# Patient Record
Sex: FEMALE | Race: WHITE | Employment: OTHER | URBAN - NONMETROPOLITAN AREA
[De-identification: names, ages, dates, MRNs, and addresses within clinical notes are randomized per-mention and may not be internally consistent; named-entity substitution may affect disease eponyms.]

---

## 2018-12-05 RX ORDER — METHYLPREDNISOLONE ACETATE 80 MG/ML
80 INJECTION, SUSPENSION INTRA-ARTICULAR; INTRALESIONAL; INTRAMUSCULAR; SOFT TISSUE ONCE
Status: CANCELLED | OUTPATIENT
Start: 2018-12-05 | End: 2018-12-05

## 2018-12-06 ENCOUNTER — HOSPITAL ENCOUNTER (OUTPATIENT)
Dept: INTERVENTIONAL RADIOLOGY/VASCULAR | Age: 63
Discharge: HOME | End: 2018-12-06
Payer: COMMERCIAL

## 2018-12-06 VITALS
RESPIRATION RATE: 18 BRPM | OXYGEN SATURATION: 98 % | SYSTOLIC BLOOD PRESSURE: 145 MMHG | TEMPERATURE: 97.7 F | HEART RATE: 71 BPM | DIASTOLIC BLOOD PRESSURE: 92 MMHG

## 2018-12-06 DIAGNOSIS — R52 PAIN: ICD-10-CM

## 2018-12-06 PROCEDURE — 2500000003 HC RX 250 WO HCPCS

## 2018-12-06 PROCEDURE — 6360000004 HC RX CONTRAST MEDICATION: Performed by: RADIOLOGY

## 2018-12-06 PROCEDURE — 2709999900 HC NON-CHARGEABLE SUPPLY

## 2018-12-06 PROCEDURE — 6360000002 HC RX W HCPCS

## 2018-12-06 PROCEDURE — 2580000003 HC RX 258

## 2018-12-06 PROCEDURE — 62321 NJX INTERLAMINAR CRV/THRC: CPT

## 2018-12-06 RX ORDER — NORTRIPTYLINE HYDROCHLORIDE 10 MG/1
10 CAPSULE ORAL NIGHTLY
COMMUNITY

## 2018-12-06 RX ORDER — DOCUSATE SODIUM 250 MG
250 CAPSULE ORAL 2 TIMES DAILY
COMMUNITY

## 2018-12-06 RX ORDER — METHYLPREDNISOLONE ACETATE 80 MG/ML
80 INJECTION, SUSPENSION INTRA-ARTICULAR; INTRALESIONAL; INTRAMUSCULAR; SOFT TISSUE ONCE
Status: COMPLETED | OUTPATIENT
Start: 2018-12-06 | End: 2018-12-06

## 2018-12-06 RX ORDER — LORATADINE 10 MG/1
10 CAPSULE, LIQUID FILLED ORAL DAILY
COMMUNITY
End: 2019-12-19

## 2018-12-06 RX ORDER — SUMATRIPTAN 50 MG/1
50 TABLET, FILM COATED ORAL
COMMUNITY

## 2018-12-06 RX ADMIN — METHYLPREDNISOLONE ACETATE 80 MG: 80 INJECTION, SUSPENSION INTRA-ARTICULAR; INTRALESIONAL; INTRAMUSCULAR; SOFT TISSUE at 13:38

## 2018-12-06 RX ADMIN — IOHEXOL 1 ML: 180 INJECTION INTRAVENOUS at 13:37

## 2018-12-06 RX ADMIN — Medication 1 ML: at 13:38

## 2018-12-06 ASSESSMENT — PAIN DESCRIPTION - DESCRIPTORS
DESCRIPTORS: ACHING
DESCRIPTORS: ACHING

## 2018-12-06 ASSESSMENT — PAIN DESCRIPTION - PAIN TYPE
TYPE: CHRONIC PAIN

## 2018-12-06 ASSESSMENT — PAIN DESCRIPTION - ORIENTATION
ORIENTATION: RIGHT
ORIENTATION: RIGHT

## 2018-12-06 ASSESSMENT — PAIN SCALES - GENERAL
PAINLEVEL_OUTOF10: 5

## 2018-12-06 ASSESSMENT — PAIN DESCRIPTION - LOCATION
LOCATION: NECK

## 2018-12-06 NOTE — PROGRESS NOTES
1245 pt arrives ambulatory for nerve block. Procedure explained and questions answered. PT RIGHTS AND RESPONSIBILITIES OFFERED TO PT. Pt denies numbness or tingling. Pt denies taking blood thinners or aspirin in past 5 days. Pedal push and pull and hand grasp strong and equal bilaterally. 1316 pt to specials via bed.   1400 pt back from specials. Vitals stable. bandaid clean, dry and intact. Pt denies new numbness or pain. Pt able to move extremities same as prior to procedure. 1415 pt denies new numbness. Vitals stable. bandaid unchanged. 1420 pt discharged ambulatory with instructions with no complaints.            _m___ Safety:       (Environmental)   [place] to environment   Ensure ID band is correct and in place/ allergy band as needed   Assess for fall risk   Initiate fall precautions as applicable (fall band, side rails, etc.)   Call light within reach   Bed in low position/ wheels locked    __m__ Pain:        Assess pain level and characteristics   Administer analgesics as ordered   Assess effectiveness of pain management and report to MD as needed    __m__ Knowledge Deficit:   Assess baseline knowledge   Provide teaching at level of understanding   Provide teaching via preferred learning method   Evaluate teaching effectiveness    __m__ Hemodynamic/Respiratory Status:       (Pre and Post Procedure Monitoring)   Assess/Monitor vital signs and LOC   Assess Baseline SpO2 prior to any sedation   Obtain weight/height   Assess vital signs/ LOC until patient meets discharge criteria   Monitor procedure site and notify MD of any issues

## 2019-12-19 ENCOUNTER — OFFICE VISIT (OUTPATIENT)
Dept: PHYSICAL MEDICINE AND REHAB | Age: 64
End: 2019-12-19
Payer: COMMERCIAL

## 2019-12-19 VITALS
HEIGHT: 66 IN | DIASTOLIC BLOOD PRESSURE: 78 MMHG | BODY MASS INDEX: 22.82 KG/M2 | WEIGHT: 142 LBS | SYSTOLIC BLOOD PRESSURE: 132 MMHG

## 2019-12-19 DIAGNOSIS — M47.819 FACET ARTHROPATHY OF SPINE: ICD-10-CM

## 2019-12-19 DIAGNOSIS — M54.81 OCCIPITAL NEURALGIA OF RIGHT SIDE: ICD-10-CM

## 2019-12-19 DIAGNOSIS — M47.812 CERVICAL SPONDYLOSIS: ICD-10-CM

## 2019-12-19 DIAGNOSIS — M54.2 CERVICALGIA: Primary | ICD-10-CM

## 2019-12-19 PROCEDURE — 99243 OFF/OP CNSLTJ NEW/EST LOW 30: CPT | Performed by: NURSE PRACTITIONER

## 2019-12-19 RX ORDER — HYDROCHLOROTHIAZIDE 25 MG/1
TABLET ORAL DAILY
COMMUNITY
Start: 2019-12-09

## 2019-12-19 RX ORDER — FAMOTIDINE 40 MG/1
TABLET, FILM COATED ORAL DAILY
COMMUNITY
Start: 2019-12-03

## 2019-12-19 RX ORDER — DIPHENOXYLATE HYDROCHLORIDE AND ATROPINE SULFATE 2.5; .025 MG/1; MG/1
TABLET ORAL DAILY
COMMUNITY

## 2019-12-19 ASSESSMENT — ENCOUNTER SYMPTOMS
SORE THROAT: 0
EYE ITCHING: 0
RHINORRHEA: 0
NAUSEA: 0
EYE REDNESS: 0
SHORTNESS OF BREATH: 0
CONSTIPATION: 1
DIARRHEA: 0
SINUS PAIN: 0
ABDOMINAL PAIN: 0
EYE PAIN: 0
BACK PAIN: 1
APNEA: 0
COUGH: 0
COLOR CHANGE: 0
SINUS PRESSURE: 0
VOMITING: 0
CHEST TIGHTNESS: 0

## 2020-01-06 ENCOUNTER — PREP FOR PROCEDURE (OUTPATIENT)
Dept: PHYSICAL MEDICINE AND REHAB | Age: 65
End: 2020-01-06

## 2020-01-06 NOTE — H&P
Kim Rudolph is an 62 y.o.  female. ChiefComplaint: Neck pain and Headaches                The patient is allergic to levaquin [levofloxacin in d5w] and sulfa antibiotics.     PastMedical History  Angeli Porras  has a past medical history of Arthritis.     Past Surgical History  The patient  has a past surgical history that includes Hand surgery (Left, 2018); Hand surgery (Right, ); Thyroid surgery (); Septoplasty (2018);  section (2574,9734,2132,0988,0172); laminectomy (); Hysterectomy (); Abdominal adhesion surgery (); and lumbar laminectomy ().    Family History  This patient's family history is not on file.     Social History  Angeli Porras  reports that she has quit smoking. She has never used smokeless tobacco. She reports previous alcohol use. She reports that she does not use drugs.     Medications    Current Medication      Current Outpatient Medications:     famotidine (PEPCID) 40 MG tablet, , Disp: , Rfl:     Multiple Vitamin (MULTI-VITAMINS) TABS, Take by mouth, Disp: , Rfl:     NONFORMULARY, Hormone, Disp: , Rfl:     hydrochlorothiazide (HYDRODIURIL) 25 MG tablet, , Disp: , Rfl:     Methylcellulose, Laxative, (CITRUCEL PO), Take by mouth, Disp: , Rfl:     docusate sodium (COLACE) 250 MG capsule, Take 250 mg by mouth 2 times daily, Disp: , Rfl:     SUMAtriptan (IMITREX) 50 MG tablet, Take 50 mg by mouth once as needed for Migraine, Disp: , Rfl:     nortriptyline (PAMELOR) 10 MG capsule, Take 10 mg by mouth nightly, Disp: , Rfl:     Cyanocobalamin (B-12 PO), Take by mouth 2 times daily, Disp: , Rfl:     MAGNESIUM PO, Take 400 mg by mouth daily , Disp: , Rfl:     CycloSPORINE (RESTASIS OP), Apply to eye 2 times daily, Disp: , Rfl:     Cholecalciferol (VITAMIN D PO), Take by mouth daily, Disp: , Rfl:         Subjective:      Review of Systems   Constitutional: Positive for activity change. Negative for chills, diaphoresis, fatigue and fever.    HENT: Negative for congestion, ear discharge, ear pain, mouth sores, nosebleeds, postnasal drip, rhinorrhea, sinus pressure, sinus pain and sore throat. Eyes: Negative for pain, redness and itching. Respiratory: Negative for apnea, cough, chest tightness and shortness of breath. Cardiovascular: Negative for chest pain, palpitations and leg swelling. Gastrointestinal: Positive for constipation. Negative for abdominal pain, diarrhea, nausea and vomiting. Endocrine: Negative for cold intolerance and heat intolerance. Genitourinary: Negative for difficulty urinating, frequency and urgency. Musculoskeletal: Positive for arthralgias, back pain, myalgias, neck pain and neck stiffness. Negative for gait problem. Skin: Negative for color change, rash and wound. Allergic/Immunologic: Positive for environmental allergies. Negative for food allergies. Neurological: Positive for headaches. Negative for dizziness, seizures, light-headedness and numbness. Hematological: Does not bruise/bleed easily. Psychiatric/Behavioral: Negative for sleep disturbance. The patient is not nervous/anxious.          Objective:      Vitals                              Weight: 142 lb (64.4 kg)   Height: 5' 5.5\" (1.664 m)            Physical Exam  Vitals signs reviewed. Constitutional:       General: She is not in acute distress. Appearance: She is well-developed. She is not diaphoretic. HENT:      Head: Normocephalic and atraumatic. Not macrocephalic and not microcephalic. Right Ear: External ear normal.      Left Ear: External ear normal.   Eyes:      General:         Right eye: No discharge. Left eye: No discharge. Conjunctiva/sclera: Conjunctivae normal.   Neck:      Musculoskeletal: Pain with movement and muscular tenderness present. Trachea: No tracheal deviation. Cardiovascular:      Rate and Rhythm: Normal rate and regular rhythm. Pulses: Normal pulses. Heart sounds: Murmur present.  Systolic

## 2020-01-10 NOTE — PROGRESS NOTES
NPO after midnight  [employer] and drivers license  Wear comfortable clean clothing  Do not bring jewelry  Shower night before and morning of surgery with a liquid antibacterial soap  Bring list of medications with dosage and how often taken  Follow all instructions given by your physician   needed at discharge  Please call Dr. Frantz Franklin office if you develop and illness, have a fever, or start antibiotics or steroids  Call [phone] for any questions

## 2020-01-13 ENCOUNTER — TELEPHONE (OUTPATIENT)
Dept: PHYSICAL MEDICINE AND REHAB | Age: 65
End: 2020-01-13

## 2020-01-13 NOTE — TELEPHONE ENCOUNTER
Procedure rescheduled as pt's. Mother passed away. Rescheduled to 1/23/20 @ 7:30, arrive at 6:30am. Requests to keep follow up appt. Due to going to [place] in February.

## 2020-01-14 ENCOUNTER — PREP FOR PROCEDURE (OUTPATIENT)
Dept: PHYSICAL MEDICINE AND REHAB | Age: 65
End: 2020-01-14

## 2020-01-14 NOTE — H&P (VIEW-ONLY)
congestion, ear discharge, ear pain, mouth sores, nosebleeds, postnasal drip, rhinorrhea, sinus pressure, sinus pain and sore throat. Eyes: Negative for pain, redness and itching. Respiratory: Negative for apnea, cough, chest tightness and shortness of breath. Cardiovascular: Negative for chest pain, palpitations and leg swelling. Gastrointestinal: Positive for constipation. Negative for abdominal pain, diarrhea, nausea and vomiting. Endocrine: Negative for cold intolerance and heat intolerance. Genitourinary: Negative for difficulty urinating, frequency and urgency. Musculoskeletal: Positive for arthralgias, back pain, myalgias, neck pain and neck stiffness. Negative for gait problem. Skin: Negative for color change, rash and wound. Allergic/Immunologic: Positive for environmental allergies. Negative for food allergies. Neurological: Positive for headaches. Negative for dizziness, seizures, light-headedness and numbness. Hematological: Does not bruise/bleed easily. Psychiatric/Behavioral: Negative for sleep disturbance. The patient is not nervous/anxious.          Objective:      Vitals                              Weight: 142 lb (64.4 kg)   Height: 5' 5.5\" (1.664 m)            Physical Exam  Vitals signs reviewed. Constitutional:       General: She is not in acute distress. Appearance: She is well-developed. She is not diaphoretic. HENT:      Head: Normocephalic and atraumatic. Not macrocephalic and not microcephalic. Right Ear: External ear normal.      Left Ear: External ear normal.   Eyes:      General:         Right eye: No discharge. Left eye: No discharge. Conjunctiva/sclera: Conjunctivae normal.   Neck:      Musculoskeletal: Pain with movement and muscular tenderness present. Trachea: No tracheal deviation. Cardiovascular:      Rate and Rhythm: Normal rate and regular rhythm. Pulses: Normal pulses. Heart sounds: Murmur present.  Systolic murmur present with a grade of 2/6. No friction rub. No gallop. Pulmonary:      Effort: Pulmonary effort is normal. No respiratory distress. Breath sounds: Normal breath sounds. No stridor. No wheezing or rhonchi. Abdominal:      General: Bowel sounds are normal. There is no distension. Palpations: Abdomen is soft. Tenderness: There is no tenderness. Hernia: No hernia is present. Musculoskeletal:         General: Tenderness present. Skin:     General: Skin is warm and dry. Coloration: Skin is not pale. Findings: No rash. Neurological:      Mental Status: She is alert and oriented to person, place, and time. Cranial Nerves: No cranial nerve deficit. Psychiatric:         Attention and Perception: She is attentive. Speech: Speech normal.         Behavior: Behavior normal.         Thought Content: Thought content normal.         Judgment: Judgment normal.            Assessment:      1. Cervicalgia    2. Cervical spondylosis    3. Facet arthropathy of spine    4.  Occipital neuralgia of right side          Plan:  C-facet MBB C1-2, C2-3, C3-4 RIGHT #1       JARAD Thompson - CNP  1/14/2020

## 2020-01-14 NOTE — H&P
congestion, ear discharge, ear pain, mouth sores, nosebleeds, postnasal drip, rhinorrhea, sinus pressure, sinus pain and sore throat. Eyes: Negative for pain, redness and itching. Respiratory: Negative for apnea, cough, chest tightness and shortness of breath. Cardiovascular: Negative for chest pain, palpitations and leg swelling. Gastrointestinal: Positive for constipation. Negative for abdominal pain, diarrhea, nausea and vomiting. Endocrine: Negative for cold intolerance and heat intolerance. Genitourinary: Negative for difficulty urinating, frequency and urgency. Musculoskeletal: Positive for arthralgias, back pain, myalgias, neck pain and neck stiffness. Negative for gait problem. Skin: Negative for color change, rash and wound. Allergic/Immunologic: Positive for environmental allergies. Negative for food allergies. Neurological: Positive for headaches. Negative for dizziness, seizures, light-headedness and numbness. Hematological: Does not bruise/bleed easily. Psychiatric/Behavioral: Negative for sleep disturbance. The patient is not nervous/anxious.          Objective:      Vitals                              Weight: 142 lb (64.4 kg)   Height: 5' 5.5\" (1.664 m)            Physical Exam  Vitals signs reviewed. Constitutional:       General: She is not in acute distress. Appearance: She is well-developed. She is not diaphoretic. HENT:      Head: Normocephalic and atraumatic. Not macrocephalic and not microcephalic. Right Ear: External ear normal.      Left Ear: External ear normal.   Eyes:      General:         Right eye: No discharge. Left eye: No discharge. Conjunctiva/sclera: Conjunctivae normal.   Neck:      Musculoskeletal: Pain with movement and muscular tenderness present. Trachea: No tracheal deviation. Cardiovascular:      Rate and Rhythm: Normal rate and regular rhythm. Pulses: Normal pulses. Heart sounds: Murmur present.  Systolic murmur present with a grade of 2/6. No friction rub. No gallop. Pulmonary:      Effort: Pulmonary effort is normal. No respiratory distress. Breath sounds: Normal breath sounds. No stridor. No wheezing or rhonchi. Abdominal:      General: Bowel sounds are normal. There is no distension. Palpations: Abdomen is soft. Tenderness: There is no tenderness. Hernia: No hernia is present. Musculoskeletal:         General: Tenderness present. Skin:     General: Skin is warm and dry. Coloration: Skin is not pale. Findings: No rash. Neurological:      Mental Status: She is alert and oriented to person, place, and time. Cranial Nerves: No cranial nerve deficit. Psychiatric:         Attention and Perception: She is attentive. Speech: Speech normal.         Behavior: Behavior normal.         Thought Content: Thought content normal.         Judgment: Judgment normal.            Assessment:      1. Cervicalgia    2. Cervical spondylosis    3. Facet arthropathy of spine    4.  Occipital neuralgia of right side          Plan:  C-facet MBB C1-2, C2-3, C3-4 RIGHT #1       JARAD Rg - CNP  1/14/2020

## 2020-01-23 ENCOUNTER — ANESTHESIA (OUTPATIENT)
Dept: OPERATING ROOM | Age: 65
End: 2020-01-23
Payer: COMMERCIAL

## 2020-01-23 ENCOUNTER — HOSPITAL ENCOUNTER (OUTPATIENT)
Age: 65
Setting detail: OUTPATIENT SURGERY
Discharge: HOME | End: 2020-01-23
Attending: PAIN MEDICINE | Admitting: PAIN MEDICINE
Payer: COMMERCIAL

## 2020-01-23 ENCOUNTER — APPOINTMENT (OUTPATIENT)
Dept: GENERAL RADIOLOGY | Age: 65
End: 2020-01-23
Attending: PAIN MEDICINE
Payer: COMMERCIAL

## 2020-01-23 ENCOUNTER — ANESTHESIA EVENT (OUTPATIENT)
Dept: OPERATING ROOM | Age: 65
End: 2020-01-23
Payer: COMMERCIAL

## 2020-01-23 VITALS
WEIGHT: 146 LBS | BODY MASS INDEX: 24.32 KG/M2 | TEMPERATURE: 97.2 F | HEART RATE: 58 BPM | SYSTOLIC BLOOD PRESSURE: 163 MMHG | RESPIRATION RATE: 16 BRPM | HEIGHT: 65 IN | OXYGEN SATURATION: 98 % | DIASTOLIC BLOOD PRESSURE: 80 MMHG

## 2020-01-23 VITALS
RESPIRATION RATE: 16 BRPM | OXYGEN SATURATION: 100 % | DIASTOLIC BLOOD PRESSURE: 80 MMHG | SYSTOLIC BLOOD PRESSURE: 156 MMHG

## 2020-01-23 PROCEDURE — 3600000055 HC PAIN LEVEL 3 ADDL 15 MIN: Performed by: PAIN MEDICINE

## 2020-01-23 PROCEDURE — 2500000003 HC RX 250 WO HCPCS: Performed by: PAIN MEDICINE

## 2020-01-23 PROCEDURE — 6360000002 HC RX W HCPCS: Performed by: PAIN MEDICINE

## 2020-01-23 PROCEDURE — 2580000003 HC RX 258: Performed by: NURSE ANESTHETIST, CERTIFIED REGISTERED

## 2020-01-23 PROCEDURE — 64491 INJ PARAVERT F JNT C/T 2 LEV: CPT | Performed by: PAIN MEDICINE

## 2020-01-23 PROCEDURE — 7100000010 HC PHASE II RECOVERY - FIRST 15 MIN: Performed by: PAIN MEDICINE

## 2020-01-23 PROCEDURE — 3209999900 FLUORO FOR SURGICAL PROCEDURES

## 2020-01-23 PROCEDURE — 2709999900 HC NON-CHARGEABLE SUPPLY: Performed by: PAIN MEDICINE

## 2020-01-23 PROCEDURE — 6360000002 HC RX W HCPCS: Performed by: NURSE ANESTHETIST, CERTIFIED REGISTERED

## 2020-01-23 PROCEDURE — 64490 INJ PARAVERT F JNT C/T 1 LEV: CPT | Performed by: PAIN MEDICINE

## 2020-01-23 PROCEDURE — 3700000001 HC ADD 15 MINUTES (ANESTHESIA): Performed by: PAIN MEDICINE

## 2020-01-23 PROCEDURE — 3600000054 HC PAIN LEVEL 3 BASE: Performed by: PAIN MEDICINE

## 2020-01-23 PROCEDURE — 7100000011 HC PHASE II RECOVERY - ADDTL 15 MIN: Performed by: PAIN MEDICINE

## 2020-01-23 PROCEDURE — 64492 INJ PARAVERT F JNT C/T 3 LEV: CPT | Performed by: PAIN MEDICINE

## 2020-01-23 PROCEDURE — 3700000000 HC ANESTHESIA ATTENDED CARE: Performed by: PAIN MEDICINE

## 2020-01-23 RX ORDER — BUPIVACAINE HYDROCHLORIDE 2.5 MG/ML
INJECTION, SOLUTION EPIDURAL; INFILTRATION; INTRACAUDAL PRN
Status: DISCONTINUED | OUTPATIENT
Start: 2020-01-23 | End: 2020-01-23 | Stop reason: ALTCHOICE

## 2020-01-23 RX ORDER — FENTANYL CITRATE 50 UG/ML
INJECTION, SOLUTION INTRAMUSCULAR; INTRAVENOUS PRN
Status: DISCONTINUED | OUTPATIENT
Start: 2020-01-23 | End: 2020-01-23 | Stop reason: SDUPTHER

## 2020-01-23 RX ORDER — LIDOCAINE HYDROCHLORIDE 10 MG/ML
INJECTION, SOLUTION INFILTRATION; PERINEURAL PRN
Status: DISCONTINUED | OUTPATIENT
Start: 2020-01-23 | End: 2020-01-23 | Stop reason: ALTCHOICE

## 2020-01-23 RX ORDER — SODIUM CHLORIDE 9 MG/ML
INJECTION, SOLUTION INTRAVENOUS CONTINUOUS PRN
Status: DISCONTINUED | OUTPATIENT
Start: 2020-01-23 | End: 2020-01-23 | Stop reason: SDUPTHER

## 2020-01-23 RX ORDER — DEXAMETHASONE SODIUM PHOSPHATE 4 MG/ML
INJECTION, SOLUTION INTRA-ARTICULAR; INTRALESIONAL; INTRAMUSCULAR; INTRAVENOUS; SOFT TISSUE PRN
Status: DISCONTINUED | OUTPATIENT
Start: 2020-01-23 | End: 2020-01-23 | Stop reason: ALTCHOICE

## 2020-01-23 RX ADMIN — FENTANYL CITRATE 50 MCG: 50 INJECTION, SOLUTION INTRAMUSCULAR; INTRAVENOUS at 08:11

## 2020-01-23 RX ADMIN — FENTANYL CITRATE 50 MCG: 50 INJECTION, SOLUTION INTRAMUSCULAR; INTRAVENOUS at 08:09

## 2020-01-23 RX ADMIN — SODIUM CHLORIDE: 9 INJECTION, SOLUTION INTRAVENOUS at 08:05

## 2020-01-23 ASSESSMENT — PULMONARY FUNCTION TESTS
PIF_VALUE: 0

## 2020-01-23 ASSESSMENT — PAIN DESCRIPTION - LOCATION: LOCATION: NECK

## 2020-01-23 ASSESSMENT — PAIN SCALES - GENERAL: PAINLEVEL_OUTOF10: 9

## 2020-01-23 ASSESSMENT — PAIN DESCRIPTION - ORIENTATION: ORIENTATION: RIGHT

## 2020-01-23 ASSESSMENT — PAIN DESCRIPTION - PAIN TYPE: TYPE: CHRONIC PAIN

## 2020-01-23 NOTE — INTERVAL H&P NOTE
H&P Update    Patient's History and Physical from January 14, 2020 was reviewed. Patient examined. There has been no change.     Electronically signed by Aminah Carlson MD on 1/23/20 at 8:04 AM
[facility]

## 2020-01-23 NOTE — PROGRESS NOTES
1484- Pt arrived to PACU phase 2, Ed Rn at bedside, pt hooked up to monitor, VSS, pt breathing deeply on room air, BP 160s, pt sates sometimes she is high like that.   0825- Ice pack applied  0826- Water given,  at bedside. 0840- IV removed. Pt to get dressed.    4829- Pt given discharge instructions verbalized understanding  91 11 64- Pt discharged walked to car with this RN

## 2020-01-30 ENCOUNTER — OFFICE VISIT (OUTPATIENT)
Dept: PHYSICAL MEDICINE AND REHAB | Age: 65
End: 2020-01-30
Payer: COMMERCIAL

## 2020-01-30 VITALS
SYSTOLIC BLOOD PRESSURE: 106 MMHG | DIASTOLIC BLOOD PRESSURE: 60 MMHG | WEIGHT: 145.94 LBS | HEIGHT: 65 IN | BODY MASS INDEX: 24.32 KG/M2

## 2020-01-30 PROCEDURE — 99213 OFFICE O/P EST LOW 20 MIN: CPT | Performed by: NURSE PRACTITIONER

## 2020-01-30 ASSESSMENT — ENCOUNTER SYMPTOMS
VOMITING: 0
ABDOMINAL PAIN: 0
DIARRHEA: 0
NAUSEA: 0
BACK PAIN: 1
COLOR CHANGE: 0
CONSTIPATION: 1

## 2020-01-30 NOTE — PROGRESS NOTES
[facility]  [address]  Dept: [phone]  Dept Fax: [phone]: [phone]    Visit Date: 1/30/2020    Functionality Assessment/Goals Worksheet     On a scale of 0 (Does not Interfere) to 10 (Completely Interferes)     1. Which number describes how during the past week pain has interfered with       the following:  A. General Activity:  1  B. Mood: 0  C. Walking Ability:  0  D. Normal Work (Includes both work outside the home and housework):  0  E. Relations with Other People:   0  F. Sleep:   0  G. Enjoyment of Life:   1    2. Patient Prefers to Take their Pain Medications:     []  On a regular basis   []  Only when necessary    [x]  Does not take pain medications    3. What are the Patient's Goals/Expectations for Visiting Pain Management? []  Learn about my pain    []  Receive Medication   []  Physical Therapy     []  Treat Depression   []  Receive Injections    []  Treat Sleep   []  Deal with Anxiety and Stress   []  Treat Opoid Dependence/Addiction   [x]  Other: nerve block       HPI:   Charity Hoffman is a 62 y.o. female is here today for    Chief Complaint: Neck pain    HPI     Diagnostic / Confirmatory Median Branch Blocks at the levels of C1-2, C2-3 and C3-4 right under fluoroscopic guidance with Dr To Reyes on 1/23/2020. Patient states about 95% for the first two days and then started to fade. Patient states she still feels 70% relief today. Patient  Going OOT and will be back beginning of March. Medications reviewed. Pain scale with out pain medications or at its worst is 6/10. Pain scale with pain medications or at its best is 0/10. Drug screen reviewed from 12/19/19 and was appropriate      The patientis allergic to levaquin [levofloxacin in d5w] and sulfa antibiotics. Past Medical History  Cheo Gonzalez  has a past medical history of Arthritis.     Past Surgical History  The patient  has a past surgical history that includes Hand surgery (Left, 2018); Hand surgery (Right, ); Thyroid surgery (); Septoplasty (2018);  section (0787,9365,7767,9096,3701); laminectomy (); Hysterectomy (); Abdominal adhesion surgery (); lumbar laminectomy (); cyst removal; and Facet joint injection (Right, 2020). Family History  This patient's family history is not on file. Social History  James Knight  reports that she has quit smoking. She has never used smokeless tobacco. She reports previous alcohol use. She reports that she does not use drugs. Medications    Current Outpatient Medications:     famotidine (PEPCID) 40 MG tablet, daily , Disp: , Rfl:     Multiple Vitamin (MULTI-VITAMINS) TABS, Take by mouth daily , Disp: , Rfl:     NONFORMULARY, Hormone implant every 3 months, Disp: , Rfl:     hydrochlorothiazide (HYDRODIURIL) 25 MG tablet, daily , Disp: , Rfl:     Methylcellulose, Laxative, (CITRUCEL PO), Take by mouth, Disp: , Rfl:     docusate sodium (COLACE) 250 MG capsule, Take 250 mg by mouth 2 times daily, Disp: , Rfl:     SUMAtriptan (IMITREX) 50 MG tablet, Take 50 mg by mouth once as needed for Migraine, Disp: , Rfl:     nortriptyline (PAMELOR) 10 MG capsule, Take 10 mg by mouth nightly, Disp: , Rfl:     Cyanocobalamin (B-12 PO), Take by mouth daily , Disp: , Rfl:     MAGNESIUM PO, Take 400 mg by mouth daily , Disp: , Rfl:     CycloSPORINE (RESTASIS OP), Apply to eye 2 times daily, Disp: , Rfl:     Cholecalciferol (VITAMIN D PO), Take by mouth daily, Disp: , Rfl:     Subjective:      Review of Systems   Constitutional: Positive for activity change. Negative for chills, diaphoresis, fatigue and fever. Gastrointestinal: Positive for constipation. Negative for abdominal pain, diarrhea, nausea and vomiting. Musculoskeletal: Positive for arthralgias, back pain, myalgias, neck pain and neck stiffness.  Negative for gait Current MED: 0  · Patient was not offered naloxone for home. · Discussed long term side effects of medications, tolerance, dependency and addiction. · Previous UDS reviewed  · Patient told can not receive any pain medications from any other source. · No evidence of abuse, diversion or aberrant behavior.  Medications and/or procedures to improve function and quality of life- patient understanding with this and that may not be pain free   Discussed with patient about safe storage of medications at home   Discussed possible weaning of medication dosing dependent on treatment/procedure results.  Testing: none   Procedures: Cervical facet MBB C1-2, C2-3 and C3-4 right #2   Discussed with patient about risks with procedure including infection, reaction to medication, increased pain, or bleeding.  Medications: none       Meds. Prescribed:   No orders of the defined types were placed in this encounter. Return for Cervical facet MBB C1-2, C2-3 and C3-4 right #2, follow up after procedure.          Electronically signed by JARAD Dougherty CNP on1/30/2020 at 8:57 AM

## 2020-02-25 ENCOUNTER — PREP FOR PROCEDURE (OUTPATIENT)
Dept: PHYSICAL MEDICINE AND REHAB | Age: 65
End: 2020-02-25

## 2020-02-25 NOTE — H&P (VIEW-ONLY)
chills, diaphoresis, fatigue and fever. Gastrointestinal: Positive for constipation. Negative for abdominal pain, diarrhea, nausea and vomiting. Musculoskeletal: Positive for arthralgias, back pain, myalgias, neck pain and neck stiffness. Negative for gait problem. Skin: Negative for color change, rash and wound. Allergic/Immunologic: Positive for environmental allergies. Negative for food allergies. Neurological: Positive for headaches. Negative for dizziness, seizures, light-headedness and numbness. Hematological: Does not bruise/bleed easily. Psychiatric/Behavioral: Negative for sleep disturbance. The patient is not nervous/anxious.          Objective:      Vitals   Weight: 145 lb 15.1 oz (66.2 kg)   Height: 5' 5\" (1.651 m)            Physical Exam  Vitals signs reviewed. Constitutional:       General: She is not in acute distress. Appearance: She is well-developed. She is not diaphoretic. HENT:      Head: Normocephalic and atraumatic. Not macrocephalic and not microcephalic. Right Ear: External ear normal.      Left Ear: External ear normal.   Eyes:      General:         Right eye: No discharge. Left eye: No discharge. Conjunctiva/sclera: Conjunctivae normal.   Neck:      Musculoskeletal: Pain with movement and muscular tenderness present. Trachea: No tracheal deviation. Pulmonary:      Effort: Pulmonary effort is normal. No respiratory distress. Musculoskeletal:         General: Tenderness present. Skin:     General: Skin is warm and dry. Coloration: Skin is not pale. Findings: No rash. Neurological:      Mental Status: She is alert and oriented to person, place, and time. Cranial Nerves: No cranial nerve deficit. Psychiatric:         Attention and Perception: She is attentive. Speech: Speech normal.         Behavior: Behavior normal.         Thought Content:  Thought content normal.         Judgment: Judgment normal.          Assessment:      1. Cervical spondylosis    2. Cervicalgia    3. Facet arthropathy of spine    4.  Occipital neuralgia of right side         Review of Systems    Physical Exam        Plan:  Cervical facet MBB C1-2, C2-3 and C3-4 right #2    JARAD Connors - CNP  2/25/2020

## 2020-03-06 ENCOUNTER — HOSPITAL ENCOUNTER (OUTPATIENT)
Age: 65
Setting detail: OUTPATIENT SURGERY
Discharge: HOME | End: 2020-03-06
Attending: PAIN MEDICINE | Admitting: PAIN MEDICINE
Payer: COMMERCIAL

## 2020-03-06 ENCOUNTER — ANESTHESIA (OUTPATIENT)
Dept: OPERATING ROOM | Age: 65
End: 2020-03-06
Payer: COMMERCIAL

## 2020-03-06 ENCOUNTER — APPOINTMENT (OUTPATIENT)
Dept: GENERAL RADIOLOGY | Age: 65
End: 2020-03-06
Attending: PAIN MEDICINE
Payer: COMMERCIAL

## 2020-03-06 ENCOUNTER — ANESTHESIA EVENT (OUTPATIENT)
Dept: OPERATING ROOM | Age: 65
End: 2020-03-06
Payer: COMMERCIAL

## 2020-03-06 VITALS
SYSTOLIC BLOOD PRESSURE: 144 MMHG | HEIGHT: 65 IN | TEMPERATURE: 98.3 F | OXYGEN SATURATION: 96 % | BODY MASS INDEX: 24.36 KG/M2 | WEIGHT: 146.2 LBS | RESPIRATION RATE: 16 BRPM | DIASTOLIC BLOOD PRESSURE: 77 MMHG | HEART RATE: 59 BPM

## 2020-03-06 VITALS
DIASTOLIC BLOOD PRESSURE: 77 MMHG | RESPIRATION RATE: 16 BRPM | SYSTOLIC BLOOD PRESSURE: 136 MMHG | OXYGEN SATURATION: 96 %

## 2020-03-06 PROCEDURE — 6360000002 HC RX W HCPCS: Performed by: NURSE ANESTHETIST, CERTIFIED REGISTERED

## 2020-03-06 PROCEDURE — 6360000002 HC RX W HCPCS: Performed by: PAIN MEDICINE

## 2020-03-06 PROCEDURE — 64490 INJ PARAVERT F JNT C/T 1 LEV: CPT | Performed by: PAIN MEDICINE

## 2020-03-06 PROCEDURE — 7100000011 HC PHASE II RECOVERY - ADDTL 15 MIN: Performed by: PAIN MEDICINE

## 2020-03-06 PROCEDURE — 3209999900 FLUORO FOR SURGICAL PROCEDURES

## 2020-03-06 PROCEDURE — 3700000000 HC ANESTHESIA ATTENDED CARE: Performed by: PAIN MEDICINE

## 2020-03-06 PROCEDURE — 64492 INJ PARAVERT F JNT C/T 3 LEV: CPT | Performed by: PAIN MEDICINE

## 2020-03-06 PROCEDURE — 7100000010 HC PHASE II RECOVERY - FIRST 15 MIN: Performed by: PAIN MEDICINE

## 2020-03-06 PROCEDURE — 2580000003 HC RX 258: Performed by: NURSE ANESTHETIST, CERTIFIED REGISTERED

## 2020-03-06 PROCEDURE — 64491 INJ PARAVERT F JNT C/T 2 LEV: CPT | Performed by: PAIN MEDICINE

## 2020-03-06 PROCEDURE — 2500000003 HC RX 250 WO HCPCS: Performed by: PAIN MEDICINE

## 2020-03-06 PROCEDURE — 2709999900 HC NON-CHARGEABLE SUPPLY: Performed by: PAIN MEDICINE

## 2020-03-06 PROCEDURE — 3600000054 HC PAIN LEVEL 3 BASE: Performed by: PAIN MEDICINE

## 2020-03-06 RX ORDER — FENTANYL CITRATE 50 UG/ML
INJECTION, SOLUTION INTRAMUSCULAR; INTRAVENOUS PRN
Status: DISCONTINUED | OUTPATIENT
Start: 2020-03-06 | End: 2020-03-06 | Stop reason: SDUPTHER

## 2020-03-06 RX ORDER — BUPIVACAINE HYDROCHLORIDE 2.5 MG/ML
INJECTION, SOLUTION EPIDURAL; INFILTRATION; INTRACAUDAL PRN
Status: DISCONTINUED | OUTPATIENT
Start: 2020-03-06 | End: 2020-03-06 | Stop reason: ALTCHOICE

## 2020-03-06 RX ORDER — SODIUM CHLORIDE 9 MG/ML
INJECTION, SOLUTION INTRAVENOUS CONTINUOUS PRN
Status: DISCONTINUED | OUTPATIENT
Start: 2020-03-06 | End: 2020-03-06 | Stop reason: SDUPTHER

## 2020-03-06 RX ORDER — DEXAMETHASONE SODIUM PHOSPHATE 4 MG/ML
INJECTION, SOLUTION INTRA-ARTICULAR; INTRALESIONAL; INTRAMUSCULAR; INTRAVENOUS; SOFT TISSUE PRN
Status: DISCONTINUED | OUTPATIENT
Start: 2020-03-06 | End: 2020-03-06 | Stop reason: ALTCHOICE

## 2020-03-06 RX ORDER — LIDOCAINE HYDROCHLORIDE 10 MG/ML
INJECTION, SOLUTION INFILTRATION; PERINEURAL PRN
Status: DISCONTINUED | OUTPATIENT
Start: 2020-03-06 | End: 2020-03-06 | Stop reason: ALTCHOICE

## 2020-03-06 RX ADMIN — FENTANYL CITRATE 100 MCG: 50 INJECTION, SOLUTION INTRAMUSCULAR; INTRAVENOUS at 07:53

## 2020-03-06 RX ADMIN — SODIUM CHLORIDE: 9 INJECTION, SOLUTION INTRAVENOUS at 07:52

## 2020-03-06 ASSESSMENT — PULMONARY FUNCTION TESTS
PIF_VALUE: 0

## 2020-03-06 ASSESSMENT — PAIN DESCRIPTION - DESCRIPTORS: DESCRIPTORS: CONSTANT;ACHING;SHARP;SHOOTING

## 2020-03-06 ASSESSMENT — PAIN - FUNCTIONAL ASSESSMENT: PAIN_FUNCTIONAL_ASSESSMENT: 0-10

## 2020-03-06 ASSESSMENT — PAIN SCALES - GENERAL: PAINLEVEL_OUTOF10: 0

## 2020-03-06 NOTE — ANESTHESIA PRE PROCEDURE
Department of Anesthesiology  Preprocedure Note       Name:  Anitra Miller   Age:  60 y.o.  :  1955                                          MRN:  [id number]         Date:  3/6/2020      Surgeon: Vane Jalloh):  Tree Brandon MD    Procedure: Cervical facet MBB C1-2, C2-3 and C3-4 right #2 (Right Neck)    Medications prior to admission:   Prior to Admission medications    Medication Sig Start Date End Date Taking? Authorizing Provider   famotidine (PEPCID) 40 MG tablet daily  12/3/19  Yes Historical Provider, MD   hydrochlorothiazide (HYDRODIURIL) 25 MG tablet daily  19  Yes Historical Provider, MD   Methylcellulose, Laxative, (CITRUCEL PO) Take by mouth   Yes Historical Provider, MD   docusate sodium (COLACE) 250 MG capsule Take 250 mg by mouth 2 times daily   Yes Historical Provider, MD   SUMAtriptan (IMITREX) 50 MG tablet Take 50 mg by mouth once as needed for Migraine   Yes Historical Provider, MD   nortriptyline (PAMELOR) 10 MG capsule Take 10 mg by mouth nightly   Yes Historical Provider, MD   CycloSPORINE (RESTASIS OP) Apply to eye 2 times daily   Yes Historical Provider, MD   Multiple Vitamin (MULTI-VITAMINS) TABS Take by mouth daily     Historical Provider, MD   NONFORMULARY Hormone implant every 3 months    Historical Provider, MD   Cyanocobalamin (B-12 PO) Take by mouth daily     Historical Provider, MD   MAGNESIUM PO Take 400 mg by mouth daily     Historical Provider, MD   Cholecalciferol (VITAMIN D PO) Take by mouth daily    Historical Provider, MD       Current medications:    No current facility-administered medications for this encounter. Allergies:     Allergies   Allergen Reactions    Levaquin [Levofloxacin In D5w] Nausea And Vomiting    Sulfa Antibiotics Hives       Problem List:    Patient Active Problem List   Diagnosis Code    Cervical spondylosis M47.812       Past Medical History:        Diagnosis Date    Arthritis     PVC (premature ventricular contraction)

## 2020-03-06 NOTE — ANESTHESIA POSTPROCEDURE EVALUATION
Department of Anesthesiology  Postprocedure Note    Patient: Ever Castaneda  MRN: [id number]  YOB: 1955  Date of evaluation: 3/6/2020  Time:  9:27 AM     Procedure Summary     Date:  03/06/20 Room / Location:  [address] [place] [address] 03 / [facility]    Anesthesia Start:  1901 Anesthesia Stop:  0805    Procedure:  Cervical facet MBB C1-2, C2-3 and C3-4 right #2 (Right Neck) Diagnosis:  (Cervical Spondylosis)    Surgeon:  Darrin Lee MD Responsible Provider:  Dang Sterling DO    Anesthesia Type:  MAC ASA Status:  2          Anesthesia Type: MAC    Jocelyn Phase I:      Jocelyn Phase II: Jocelyn Score: 10    Last vitals: Reviewed and per EMR flowsheets. Anesthesia Post Evaluation    Comments: Kemal Parker 61  POST-ANESTHESIA NOTE       Name:  Ever Castaneda                                         Age:  60 y.o. MRN:  [id number]      Last Vitals:  BP (!) 144/77   Pulse 59   Temp 98.3 °F (36.8 °C) (Temporal)   Resp 16   Ht 5' 5\" (1.651 m)   Wt 146 lb 3.2 oz (66.3 kg)   SpO2 96%   BMI 24.33 kg/m²   Patient Vitals in the past 4 hrs:  03/06/20 0805, BP:(!) 144/77, Temp:98.3 °F (36.8 °C), Temp src:Temporal, Pulse:59, Resp:16, SpO2:96 %  03/06/20 0648, BP:128/68, Temp:97.6 °F (36.4 °C), Temp src:Temporal, Pulse:64, Resp:15, SpO2:96 %, Height:5' 5\" (1.651 m), Weight:146 lb 3.2 oz (66.3 kg)    Level of Consciousness:  Awake    Respiratory:  Stable    Oxygen Saturation:  Stable    Cardiovascular:  Stable    Hydration:  Adequate    PONV:  Stable    Post-op Pain:  Adequate analgesia    Post-op Assessment:  No apparent anesthetic complications    Additional Follow-Up / Treatment / Comment:  None    Samantha Marshall DO  March 6, 2020   9:27 AM

## 2020-03-16 ENCOUNTER — TELEPHONE (OUTPATIENT)
Dept: PHYSICAL MEDICINE AND REHAB | Age: 65
End: 2020-03-16

## 2020-03-16 NOTE — TELEPHONE ENCOUNTER
Left message to call office to reschedule 3/20/2020 appointment. Asked about results of procedure.      Electronically signed by JARAD Kevin CNP on 3/16/2020 at 2:38 PM

## 2020-03-19 ENCOUNTER — TELEPHONE (OUTPATIENT)
Dept: PHYSICAL MEDICINE AND REHAB | Age: 65
End: 2020-03-19

## 2020-05-04 ENCOUNTER — VIRTUAL VISIT (OUTPATIENT)
Dept: PHYSICAL MEDICINE AND REHAB | Age: 65
End: 2020-05-04
Payer: COMMERCIAL

## 2020-05-04 PROCEDURE — 99213 OFFICE O/P EST LOW 20 MIN: CPT | Performed by: NURSE PRACTITIONER

## 2020-05-04 RX ORDER — PREGABALIN 25 MG/1
25 CAPSULE ORAL 3 TIMES DAILY
Qty: 42 CAPSULE | Refills: 0 | Status: ON HOLD | OUTPATIENT
Start: 2020-05-04 | End: 2020-06-22

## 2020-05-04 ASSESSMENT — ENCOUNTER SYMPTOMS
VOMITING: 0
NAUSEA: 0
COLOR CHANGE: 0
DIARRHEA: 0
BACK PAIN: 1
ABDOMINAL PAIN: 0
CONSTIPATION: 1

## 2020-05-11 ENCOUNTER — TELEPHONE (OUTPATIENT)
Dept: PHYSICAL MEDICINE AND REHAB | Age: 65
End: 2020-05-11

## 2020-06-19 ENCOUNTER — HOSPITAL ENCOUNTER (OUTPATIENT)
Age: 65
Discharge: HOME | End: 2020-06-19
Payer: COMMERCIAL

## 2020-06-19 PROCEDURE — U0002 COVID-19 LAB TEST NON-CDC: HCPCS

## 2020-06-21 LAB
PERFORMING LAB: NORMAL
REPORT: NORMAL
SARS-COV-2: NOT DETECTED

## 2020-06-22 ENCOUNTER — HOSPITAL ENCOUNTER (OUTPATIENT)
Age: 65
Setting detail: OUTPATIENT SURGERY
Discharge: HOME | End: 2020-06-22
Attending: PAIN MEDICINE | Admitting: PAIN MEDICINE
Payer: COMMERCIAL

## 2020-06-22 ENCOUNTER — ANESTHESIA EVENT (OUTPATIENT)
Dept: OPERATING ROOM | Age: 65
End: 2020-06-22
Payer: COMMERCIAL

## 2020-06-22 ENCOUNTER — ANESTHESIA (OUTPATIENT)
Dept: OPERATING ROOM | Age: 65
End: 2020-06-22
Payer: COMMERCIAL

## 2020-06-22 ENCOUNTER — APPOINTMENT (OUTPATIENT)
Dept: GENERAL RADIOLOGY | Age: 65
End: 2020-06-22
Attending: PAIN MEDICINE
Payer: COMMERCIAL

## 2020-06-22 VITALS
BODY MASS INDEX: 24.43 KG/M2 | HEIGHT: 66 IN | TEMPERATURE: 97.3 F | DIASTOLIC BLOOD PRESSURE: 74 MMHG | SYSTOLIC BLOOD PRESSURE: 141 MMHG | OXYGEN SATURATION: 95 % | RESPIRATION RATE: 15 BRPM | HEART RATE: 62 BPM | WEIGHT: 152 LBS

## 2020-06-22 VITALS
DIASTOLIC BLOOD PRESSURE: 93 MMHG | OXYGEN SATURATION: 96 % | RESPIRATION RATE: 10 BRPM | SYSTOLIC BLOOD PRESSURE: 153 MMHG

## 2020-06-22 PROCEDURE — 3600000056 HC PAIN LEVEL 4 BASE: Performed by: PAIN MEDICINE

## 2020-06-22 PROCEDURE — 64634 DESTROY C/TH FACET JNT ADDL: CPT | Performed by: PAIN MEDICINE

## 2020-06-22 PROCEDURE — 3700000000 HC ANESTHESIA ATTENDED CARE: Performed by: PAIN MEDICINE

## 2020-06-22 PROCEDURE — 6360000002 HC RX W HCPCS: Performed by: PAIN MEDICINE

## 2020-06-22 PROCEDURE — 2709999900 HC NON-CHARGEABLE SUPPLY: Performed by: PAIN MEDICINE

## 2020-06-22 PROCEDURE — 2720000010 HC SURG SUPPLY STERILE: Performed by: PAIN MEDICINE

## 2020-06-22 PROCEDURE — 2580000003 HC RX 258: Performed by: NURSE ANESTHETIST, CERTIFIED REGISTERED

## 2020-06-22 PROCEDURE — 3600000057 HC PAIN LEVEL 4 ADDL 15 MIN: Performed by: PAIN MEDICINE

## 2020-06-22 PROCEDURE — 3700000001 HC ADD 15 MINUTES (ANESTHESIA): Performed by: PAIN MEDICINE

## 2020-06-22 PROCEDURE — 2500000003 HC RX 250 WO HCPCS: Performed by: PAIN MEDICINE

## 2020-06-22 PROCEDURE — 6360000002 HC RX W HCPCS: Performed by: NURSE ANESTHETIST, CERTIFIED REGISTERED

## 2020-06-22 PROCEDURE — 64633 DESTROY CERV/THOR FACET JNT: CPT | Performed by: PAIN MEDICINE

## 2020-06-22 PROCEDURE — 7100000011 HC PHASE II RECOVERY - ADDTL 15 MIN: Performed by: PAIN MEDICINE

## 2020-06-22 PROCEDURE — 3209999900 FLUORO FOR SURGICAL PROCEDURES

## 2020-06-22 PROCEDURE — 7100000010 HC PHASE II RECOVERY - FIRST 15 MIN: Performed by: PAIN MEDICINE

## 2020-06-22 RX ORDER — SODIUM CHLORIDE 9 MG/ML
INJECTION, SOLUTION INTRAVENOUS CONTINUOUS PRN
Status: DISCONTINUED | OUTPATIENT
Start: 2020-06-22 | End: 2020-06-22 | Stop reason: SDUPTHER

## 2020-06-22 RX ORDER — PROPOFOL 10 MG/ML
INJECTION, EMULSION INTRAVENOUS PRN
Status: DISCONTINUED | OUTPATIENT
Start: 2020-06-22 | End: 2020-06-22 | Stop reason: SDUPTHER

## 2020-06-22 RX ORDER — LIDOCAINE HYDROCHLORIDE 10 MG/ML
INJECTION, SOLUTION EPIDURAL; INFILTRATION; INTRACAUDAL; PERINEURAL PRN
Status: DISCONTINUED | OUTPATIENT
Start: 2020-06-22 | End: 2020-06-22 | Stop reason: ALTCHOICE

## 2020-06-22 RX ORDER — FENTANYL CITRATE 50 UG/ML
INJECTION, SOLUTION INTRAMUSCULAR; INTRAVENOUS PRN
Status: DISCONTINUED | OUTPATIENT
Start: 2020-06-22 | End: 2020-06-22 | Stop reason: SDUPTHER

## 2020-06-22 RX ORDER — BUPIVACAINE HYDROCHLORIDE 2.5 MG/ML
INJECTION, SOLUTION EPIDURAL; INFILTRATION; INTRACAUDAL PRN
Status: DISCONTINUED | OUTPATIENT
Start: 2020-06-22 | End: 2020-06-22 | Stop reason: ALTCHOICE

## 2020-06-22 RX ORDER — DEXAMETHASONE SODIUM PHOSPHATE 4 MG/ML
INJECTION, SOLUTION INTRA-ARTICULAR; INTRALESIONAL; INTRAMUSCULAR; INTRAVENOUS; SOFT TISSUE PRN
Status: DISCONTINUED | OUTPATIENT
Start: 2020-06-22 | End: 2020-06-22 | Stop reason: ALTCHOICE

## 2020-06-22 RX ADMIN — FENTANYL CITRATE 25 MCG: 50 INJECTION, SOLUTION INTRAMUSCULAR; INTRAVENOUS at 08:28

## 2020-06-22 RX ADMIN — PROPOFOL 50 MG: 10 INJECTION, EMULSION INTRAVENOUS at 08:32

## 2020-06-22 RX ADMIN — SODIUM CHLORIDE: 9 INJECTION, SOLUTION INTRAVENOUS at 08:14

## 2020-06-22 RX ADMIN — FENTANYL CITRATE 25 MCG: 50 INJECTION, SOLUTION INTRAMUSCULAR; INTRAVENOUS at 08:20

## 2020-06-22 ASSESSMENT — PULMONARY FUNCTION TESTS
PIF_VALUE: 0

## 2020-06-22 ASSESSMENT — PAIN - FUNCTIONAL ASSESSMENT: PAIN_FUNCTIONAL_ASSESSMENT: 0-10

## 2020-06-22 ASSESSMENT — PAIN DESCRIPTION - DESCRIPTORS: DESCRIPTORS: ACHING;STABBING

## 2020-06-22 ASSESSMENT — PAIN SCALES - GENERAL: PAINLEVEL_OUTOF10: 0

## 2020-06-22 NOTE — ANESTHESIA PRE PROCEDURE
Department of Anesthesiology  Preprocedure Note       Name:  Elier Oh   Age:  60 y.o.  :  1955                                          MRN:  [id number]         Date:  2020      Surgeon: Doris Gomez):  Claudette Pearson MD    Procedure: Procedure(s):  CERVICAL RFA C1-2, C2-3 and C3-4 right,    Medications prior to admission:   Prior to Admission medications    Medication Sig Start Date End Date Taking?  Authorizing Provider   famotidine (PEPCID) 40 MG tablet daily  12/3/19  Yes Historical Provider, MD   NONFORMULARY Hormone implant every 3 months   Yes Historical Provider, MD   hydrochlorothiazide (HYDRODIURIL) 25 MG tablet daily  19  Yes Historical Provider, MD   Methylcellulose, Laxative, (CITRUCEL PO) Take by mouth   Yes Historical Provider, MD   docusate sodium (COLACE) 250 MG capsule Take 250 mg by mouth 2 times daily   Yes Historical Provider, MD   SUMAtriptan (IMITREX) 50 MG tablet Take 50 mg by mouth once as needed for Migraine   Yes Historical Provider, MD   nortriptyline (PAMELOR) 10 MG capsule Take 10 mg by mouth nightly   Yes Historical Provider, MD   Multiple Vitamin (MULTI-VITAMINS) TABS Take by mouth daily     Historical Provider, MD   Cyanocobalamin (B-12 PO) Take by mouth daily     Historical Provider, MD   MAGNESIUM PO Take 400 mg by mouth daily     Historical Provider, MD   CycloSPORINE (RESTASIS OP) Apply to eye 2 times daily    Historical Provider, MD   Cholecalciferol (VITAMIN D PO) Take by mouth daily    Historical Provider, MD       Current medications:    Current Facility-Administered Medications   Medication Dose Route Frequency Provider Last Rate Last Dose    lidocaine PF 1 % injection    PRN Claudette Pearson MD   9 mL at 20 0823    Dexamethasone Sodium Phosphate injection    PRN Claudette Pearson MD   6 mg at 20 0823    bupivacaine (PF) (MARCAINE) 0.25 % injection    PRN Claudette Pearson MD   1.5 mL at 20 1635 Facility-Administered Medications Ordered in Other Encounters   Medication Dose Route Frequency Provider Last Rate Last Dose    fentaNYL (SUBLIMAZE) injection    PRN Esdras Cruz, APRN - CRNA   25 mcg at 20 0828    0.9 % sodium chloride infusion    Continuous PRN Esdras Cruz, APRN - CRNA        propofol injection    PRN Esdras Cruz, APRN - CRNA   50 mg at 20 9026       Allergies:     Allergies   Allergen Reactions    Levaquin [Levofloxacin In D5w] Nausea And Vomiting    Sulfa Antibiotics Hives       Problem List:    Patient Active Problem List   Diagnosis Code    Cervical spondylosis M47.812       Past Medical History:        Diagnosis Date    Arthritis     PVC (premature ventricular contraction) 2019       Past Surgical History:        Procedure Laterality Date    ABDOMINAL ADHESION SURGERY  2002     SECTION  2056,[id number],4397,5320    CYST REMOVAL      spinal cord    FACET JOINT INJECTION Right 2020    C-facet MBB C1-2, C2-3, C3-4 RIGHT #1 performed by Shama Agrawal MD at [facility] INJECTION Right 3/6/2020    Cervical facet MBB C1-2, C2-3 and C3-4 right #2 performed by Shama Agrawal MD at [facility] HAND SURGERY Left 2018    HAND SURGERY Right    [address]  2012    SEPTOPLASTY  2018    THYROID SURGERY  2018    bx of nodules       Social History:    Social History     Tobacco Use    Smoking status: Former Smoker    Smokeless tobacco: Never Used   Substance Use Topics    Alcohol use: Yes     Comment: socially                                 Counseling given: Not Answered      Vital Signs (Current):   Vitals:    20 0728   BP: 133/71   Pulse: 65   Resp: 16   Temp: 97.1 °F (36.2 °C)   TempSrc: Temporal   SpO2: 99%   Weight: 152 lb (68.9 kg)   Height: 5' 5.5\" (1.664 m)                                              BP Readings from Last 3 Encounters:   06/22/20 133/71   06/22/20 (!) 153/93   03/06/20 (!) 144/77       NPO Status: Time of last liquid consumption: 2100                        Time of last solid consumption: 2100                        Date of last liquid consumption: 06/21/20                        Date of last solid food consumption: 06/21/20    BMI:   Wt Readings from Last 3 Encounters:   06/22/20 152 lb (68.9 kg)   03/06/20 146 lb 3.2 oz (66.3 kg)   01/30/20 145 lb 15.1 oz (66.2 kg)     Body mass index is 24.91 kg/m². CBC:   Lab Results   Component Value Date    WBC 7.6 03/18/2012    RBC 3.70 03/18/2012    HCT 33.8 03/18/2012    MCV 91.5 03/18/2012    RDW 13.3 03/18/2012     03/18/2012       CMP:   Lab Results   Component Value Date     03/18/2012    K 3.7 03/18/2012     03/18/2012    CO2 19 03/18/2012    BUN 11 03/18/2012    CREATININE 0.4 03/18/2012    LABGLOM >90 03/18/2012    GLUCOSE 70 03/18/2012    CALCIUM 9.0 03/18/2012       POC Tests: No results for input(s): POCGLU, POCNA, POCK, POCCL, POCBUN, POCHEMO, POCHCT in the last 72 hours.     Coags: No results found for: PROTIME, INR, APTT    HCG (If Applicable): No results found for: PREGTESTUR, PREGSERUM, HCG, HCGQUANT     ABGs: No results found for: PHART, PO2ART, EFM5FFE, YEB7OEY, BEART, Z8VRASRA     Type & Screen (If Applicable):  Lab Results   Component Value Date    LABRH POS 03/14/2012       Drug/Infectious Status (If Applicable):  No results found for: HIV, HEPCAB    COVID-19 Screening (If Applicable):   Lab Results   Component Value Date    COVID19 NOT DETECTED 06/19/2020         Anesthesia Evaluation  Patient summary reviewed and Nursing notes reviewed no history of anesthetic complications:   Airway: Mallampati: II  TM distance: >3 FB   Neck ROM: limited  Mouth opening: > = 3 FB Dental:          Pulmonary:Negative Pulmonary ROS and normal exam  breath sounds clear to auscultation                             Cardiovascular:  Exercise

## 2020-06-22 NOTE — H&P
[address] [address]  History and Physical Update    Pt Name: Edgard Reddy  MRN: [id number]  YOB: 1955  Date of evaluation: 6/22/2020      I have examined the patient and reviewed the H&P/Consult and there are no changes to the patient or plans.         Electronically signed by Ramiro Arevalo MD on 6/22/2020 at 7:49 AM
Rfl:     NONFORMULARY, Hormone implant every 3 months, Disp: , Rfl:     hydrochlorothiazide (HYDRODIURIL) 25 MG tablet, daily , Disp: , Rfl:     Methylcellulose, Laxative, (CITRUCEL PO), Take by mouth, Disp: , Rfl:     docusate sodium (COLACE) 250 MG capsule, Take 250 mg by mouth 2 times daily, Disp: , Rfl:     SUMAtriptan (IMITREX) 50 MG tablet, Take 50 mg by mouth once as needed for Migraine, Disp: , Rfl:     nortriptyline (PAMELOR) 10 MG capsule, Take 10 mg by mouth nightly, Disp: , Rfl:     Cyanocobalamin (B-12 PO), Take by mouth daily , Disp: , Rfl:     MAGNESIUM PO, Take 400 mg by mouth daily , Disp: , Rfl:     CycloSPORINE (RESTASIS OP), Apply to eye 2 times daily, Disp: , Rfl:     Cholecalciferol (VITAMIN D PO), Take by mouth daily, Disp: , Rfl:         Subjective:      Review of Systems   Constitutional: Positive for activity change. Negative for chills, diaphoresis, fatigue and fever. Gastrointestinal: Positive for constipation. Negative for abdominal pain, diarrhea, nausea and vomiting. Musculoskeletal: Positive for arthralgias, back pain, myalgias, neck pain and neck stiffness. Negative for gait problem. Skin: Negative for color change, rash and wound. Allergic/Immunologic: Positive for environmental allergies. Negative for food allergies. Neurological: Positive for headaches. Negative for dizziness, seizures, light-headedness and numbness. Hematological: Does not bruise/bleed easily. Psychiatric/Behavioral: Negative for sleep disturbance. The patient is not nervous/anxious.          Objective:      Vitals   There were no vitals filed for this visit.        Physical Exam  Constitutional:       General: She is awake. She is not in acute distress. Appearance: Normal appearance. She is well-developed and normal weight. She is not ill-appearing or toxic-appearing. HENT:      Head: Normocephalic and atraumatic. No raccoon eyes.    Pulmonary:      Effort: Pulmonary effort is

## 2020-07-13 ENCOUNTER — OFFICE VISIT (OUTPATIENT)
Dept: PHYSICAL MEDICINE AND REHAB | Age: 65
End: 2020-07-13
Payer: COMMERCIAL

## 2020-07-13 VITALS
HEART RATE: 62 BPM | BODY MASS INDEX: 24.43 KG/M2 | TEMPERATURE: 97.3 F | DIASTOLIC BLOOD PRESSURE: 76 MMHG | HEIGHT: 66 IN | WEIGHT: 152 LBS | SYSTOLIC BLOOD PRESSURE: 118 MMHG

## 2020-07-13 PROCEDURE — 99214 OFFICE O/P EST MOD 30 MIN: CPT | Performed by: NURSE PRACTITIONER

## 2020-07-13 ASSESSMENT — ENCOUNTER SYMPTOMS
DIARRHEA: 0
COLOR CHANGE: 0
VOMITING: 0
NAUSEA: 0
ABDOMINAL PAIN: 0
BACK PAIN: 1
CONSTIPATION: 1

## 2020-07-13 NOTE — PROGRESS NOTES
[address] [facility]  [address]  Dept: [phone]  Dept Fax: [phone]: [phone]    Visit Date: 7/13/2020    Functionality Assessment/Goals Worksheet     On a scale of 0 (Does not Interfere) to 10 (Completely Interferes)     1. Which number describes how during the past week pain has interfered with       the following:  A. General Activity:  8  B. Mood: 8  C. Walking Ability:  0  D. Normal Work (Includes both work outside the home and housework):  7  E. Relations with Other People:   4  F. Sleep:   0  G. Enjoyment of Life:   8    2. Patient Prefers to Take their Pain Medications:     []  On a regular basis   []  Only when necessary    [x]  Does not take pain medications    3. What are the Patient's Goals/Expectations for Visiting Pain Management? [x]  Learn about my pain    []  Receive Medication   []  Physical Therapy     []  Treat Depression   []  Receive Injections    []  Treat Sleep   []  Deal with Anxiety and Stress   []  Treat Opoid Dependence/Addiction   []  Other:      HPI:   Andre Peña is a 60 y.o. female is here today for    Chief Complaint: Neck pain    HPI     Radiofrequency abalation of median branchs at the levels of C1-2,C2-3 and C3-4 right under fluoroscopic guidance with Dr Mavsi Dowd on 6/22/2020. Patient states she has gained some ROM up and down but not side to side. Patient states 50-60% relief with pain. States pain and ROM worse with turning head left to right. Patient continues with PT for dry needling. Patient with complaints of occasional right arm numbness and into the lateral hand. Patient continues to work out, low weight high rep. Patient with posterior shoulder numbness is constant. Lyrica 25mg TID - made her swell so she stopped it. She sleeps on a hating pad. Gabapentin made her feel \"bad\".  Uses topical diclofenac gel and also CBD compound. Medications reviewed. .  Drug screen reviewed from 19 and was appropriate      The patientis allergic to levaquin [levofloxacin in d5w] and sulfa antibiotics. Past Medical History  Glo Zavala  has a past medical history of Arthritis and PVC (premature ventricular contraction). Past Surgical History  The patient  has a past surgical history that includes Hand surgery (Left, 2018); Hand surgery (Right, ); Thyroid surgery (); Septoplasty (2018);  section (9920,5213,5452,6791,4362); laminectomy (); Hysterectomy (); Abdominal adhesion surgery (); lumbar laminectomy (); cyst removal; Facet joint injection (Right, 2020); Facet joint injection (Right, 3/6/2020); and Pain management procedure (Right, 2020). Family History  This patient's family history is not on file. Social History  Glo Zavala  reports that she has quit smoking. She has never used smokeless tobacco. She reports current alcohol use. She reports that she does not use drugs.     Medications    Current Outpatient Medications:     diclofenac sodium (VOLTAREN) 1 % GEL, Apply 2 g topically 4 times daily, Disp: 2 Tube, Rfl: 1    famotidine (PEPCID) 40 MG tablet, daily , Disp: , Rfl:     Multiple Vitamin (MULTI-VITAMINS) TABS, Take by mouth daily , Disp: , Rfl:     NONFORMULARY, Hormone implant every 3 months, Disp: , Rfl:     hydrochlorothiazide (HYDRODIURIL) 25 MG tablet, daily , Disp: , Rfl:     Methylcellulose, Laxative, (CITRUCEL PO), Take by mouth, Disp: , Rfl:     docusate sodium (COLACE) 250 MG capsule, Take 250 mg by mouth 2 times daily, Disp: , Rfl:     SUMAtriptan (IMITREX) 50 MG tablet, Take 50 mg by mouth once as needed for Migraine, Disp: , Rfl:     nortriptyline (PAMELOR) 10 MG capsule, Take 10 mg by mouth nightly, Disp: , Rfl:     Cyanocobalamin (B-12 PO), Take by mouth daily , Disp: , Rfl:     MAGNESIUM PO, Take 400 mg by mouth daily , Disp: , Rfl:     CycloSPORINE (RESTASIS OP), Apply to eye 2 times daily, Disp: , Rfl:     Cholecalciferol (VITAMIN D PO), Take by mouth daily, Disp: , Rfl:     Subjective:      Review of Systems   Constitutional: Positive for activity change. Negative for chills, diaphoresis, fatigue and fever. Gastrointestinal: Positive for constipation. Negative for abdominal pain, diarrhea, nausea and vomiting. Musculoskeletal: Positive for arthralgias, back pain, myalgias, neck pain and neck stiffness. Negative for gait problem. Skin: Negative for color change, rash and wound. Allergic/Immunologic: Positive for environmental allergies. Negative for food allergies. Neurological: Positive for headaches. Negative for dizziness, seizures, light-headedness and numbness. Hematological: Does not bruise/bleed easily. Psychiatric/Behavioral: Negative for sleep disturbance. The patient is not nervous/anxious. Objective:     Vitals:    07/13/20 0839   BP: 118/76   Site: Left Upper Arm   Position: Sitting   Cuff Size: Medium Adult   Pulse: 62   Temp: 97.3 °F (36.3 °C)   TempSrc: Temporal   Weight: 152 lb (68.9 kg)   Height: 5' 5.5\" (1.664 m)       Physical Exam  Vitals signs reviewed. Constitutional:       General: She is not in acute distress. Appearance: She is well-developed. She is not diaphoretic. HENT:      Head: Normocephalic and atraumatic. Not macrocephalic and not microcephalic. Right Ear: External ear normal.      Left Ear: External ear normal.   Eyes:      General:         Right eye: No discharge. Left eye: No discharge. Conjunctiva/sclera: Conjunctivae normal.   Neck:      Musculoskeletal: Pain with movement and muscular tenderness present. Trachea: No tracheal deviation. Pulmonary:      Effort: Pulmonary effort is normal. No respiratory distress. Musculoskeletal:         General: Tenderness present. Skin:     General: Skin is warm and dry. Coloration: Skin is not pale.       Findings: No rash.   Neurological:      Mental Status: She is alert and oriented to person, place, and time. Cranial Nerves: No cranial nerve deficit. Psychiatric:         Attention and Perception: She is attentive. Speech: Speech normal.         Behavior: Behavior normal.         Thought Content: Thought content normal.         Judgment: Judgment normal.            Assessment:     1. Cervical spondylosis    2. Facet arthropathy of spine    3. Cervicalgia    4. Occipital neuralgia of right side    5. Cervical radiculopathy            Plan:      · OARRS reviewed. Current MED: 0  · Patient was not offered naloxone for home. · Discussed long term side effects of medications, tolerance, dependency and addiction. · Previous UDS reviewed  · Patient told can not receive any pain medications from any other source. · No evidence of abuse, diversion or aberrant behavior.  Medications and/or procedures to improve function and quality of life- patient understanding with this and that may not be pain free   Discussed with patient about safe storage of medications at home   Discussed possible weaning of medication dosing dependent on treatment/procedure results.  Testing: RUE EMG with Dr Jasper Stewart Procedures: none   Discussed with patient about risks with procedure including infection, reaction to medication, increased pain, or bleeding.  Medications: diclofenac gel QID       Meds. Prescribed:   Orders Placed This Encounter   Medications    diclofenac sodium (VOLTAREN) 1 % GEL     Sig: Apply 2 g topically 4 times daily     Dispense:  2 Tube     Refill:  1       Return for follow up after EMG.          Electronically signed by JAARD Sifuentes CNP on7/13/2020 at 9:15 AM

## 2020-08-21 ENCOUNTER — OFFICE VISIT (OUTPATIENT)
Dept: PHYSICAL MEDICINE AND REHAB | Age: 65
End: 2020-08-21
Payer: COMMERCIAL

## 2020-08-21 VITALS
BODY MASS INDEX: 24.43 KG/M2 | HEIGHT: 66 IN | DIASTOLIC BLOOD PRESSURE: 72 MMHG | SYSTOLIC BLOOD PRESSURE: 124 MMHG | WEIGHT: 152 LBS

## 2020-08-21 PROCEDURE — 99214 OFFICE O/P EST MOD 30 MIN: CPT | Performed by: NURSE PRACTITIONER

## 2020-08-21 ASSESSMENT — ENCOUNTER SYMPTOMS
DIARRHEA: 0
ABDOMINAL PAIN: 0
CONSTIPATION: 1
COLOR CHANGE: 0
VOMITING: 0
NAUSEA: 0
BACK PAIN: 1

## 2020-08-21 NOTE — PATIENT INSTRUCTIONS
 Testing: none    Procedures: upper cervical TPI ROBAXIN with Dr Guevara Sandoval. Right occipital nerve block with Dr Guevara Sandoval 2 weeks later.  Medications: continue diclofenac gel. Increase nortriptyline to 20mg at night - ok to take over script at our office.

## 2020-08-21 NOTE — PROGRESS NOTES
[facility]  [address]  Dept: [phone]  Dept Fax: [phone]: [phone]    Visit Date: 8/21/2020    Functionality Assessment/Goals Worksheet     On a scale of 0 (Does not Interfere) to 10 (Completely Interferes)     1. Which number describes how during the past week pain has interfered with       the following:  A. General Activity:  8  B. Mood: 5  C. Walking Ability:  0  D. Normal Work (Includes both work outside the home and housework):  7  E. Relations with Other People:   6  F. Sleep:   1  G. Enjoyment of Life:   8    2. Patient Prefers to Take their Pain Medications:     []  On a regular basis   []  Only when necessary    [x]  Does not take pain medications    3. What are the Patient's Goals/Expectations for Visiting Pain Management? []  Learn about my pain    []  Receive Medication   []  Physical Therapy     []  Treat Depression   [x]  Receive Injections    []  Treat Sleep   []  Deal with Anxiety and Stress   []  Treat Opoid Dependence/Addiction   [x]  Other:  EMG results and injections        HPI:   Radha Boyd is a 60 y.o. female is here today for    Chief Complaint: arm and Neck pain    HPI     EMg results -    occipital neuralgia, discussed occipital nerve block for headaches, patient agree   Right CTS - completes exercises everyday for this. States pain is occasional, not constant. Increased excitement through the right paraspinal muscles possibly  in corelation to spondylosis. \"Radiofrequency abalation of median branchs at the levels of C1-2,C2-3 and C3-4 right under fluoroscopic guidance with Dr Edin Chakraborty on 6/22/2020. Patient states she has gained some ROM up and down but not side to side. Patient states 50-60% relief with pain. \"     Discussed occipital nerve block and robaxin TPI for the upper cervical. Patient agreeable to this.      Lyrica capsule, Take 10 mg by mouth nightly, Disp: , Rfl:     Cyanocobalamin (B-12 PO), Take by mouth daily , Disp: , Rfl:     MAGNESIUM PO, Take 400 mg by mouth daily , Disp: , Rfl:     CycloSPORINE (RESTASIS OP), Apply to eye 2 times daily, Disp: , Rfl:     Cholecalciferol (VITAMIN D PO), Take by mouth daily, Disp: , Rfl:     Subjective:      Review of Systems   Constitutional: Positive for activity change. Negative for chills, diaphoresis, fatigue and fever. Gastrointestinal: Positive for constipation. Negative for abdominal pain, diarrhea, nausea and vomiting. Musculoskeletal: Positive for arthralgias, back pain, myalgias, neck pain and neck stiffness. Negative for gait problem. Skin: Negative for color change, rash and wound. Allergic/Immunologic: Positive for environmental allergies. Negative for food allergies. Neurological: Positive for headaches. Negative for dizziness, seizures, light-headedness and numbness. Hematological: Does not bruise/bleed easily. Psychiatric/Behavioral: Negative for sleep disturbance. The patient is not nervous/anxious. Objective:     Vitals:    08/21/20 1122   BP: 124/72   Weight: 152 lb (68.9 kg)   Height: 5' 5.5\" (1.664 m)       Physical Exam  Vitals signs reviewed. Constitutional:       General: She is not in acute distress. Appearance: She is well-developed. She is not diaphoretic. HENT:      Head: Normocephalic and atraumatic. Not macrocephalic and not microcephalic. Right Ear: External ear normal.      Left Ear: External ear normal.   Eyes:      General:         Right eye: No discharge. Left eye: No discharge. Conjunctiva/sclera: Conjunctivae normal.   Neck:      Musculoskeletal: Pain with movement and muscular tenderness present. Trachea: No tracheal deviation. Pulmonary:      Effort: Pulmonary effort is normal. No respiratory distress. Musculoskeletal:         General: Tenderness present.    Skin:     General: Skin is

## 2020-09-23 ENCOUNTER — PROCEDURE VISIT (OUTPATIENT)
Dept: PHYSICAL MEDICINE AND REHAB | Age: 65
End: 2020-09-23
Payer: COMMERCIAL

## 2020-09-23 VITALS
SYSTOLIC BLOOD PRESSURE: 122 MMHG | HEIGHT: 66 IN | BODY MASS INDEX: 24.43 KG/M2 | WEIGHT: 152 LBS | DIASTOLIC BLOOD PRESSURE: 78 MMHG

## 2020-09-23 PROCEDURE — 64405 NJX AA&/STRD GR OCPL NRV: CPT | Performed by: PAIN MEDICINE

## 2020-09-23 NOTE — PROGRESS NOTES
Procedure  Visit Date: 9/23/20    Krysten Sánchez is a 60 y.o. female presents today in the office for the following:      History of Present Illness   Tc Reyes is here today for occipital nerve block. Pre-Op Diagnosis   Right Occipital neuralgia    Post-Op Diagnosis   Right Occipital neuralgia    Procedure Documentation   The patient is positioned and landmarks identified. The occipital artery is palpated. Site was sprayed with Ethyl Chloride. Skin over the area was prepped with Betadine. #25-gauge 1-1/2 inch hypodermic needle was inserted through the skin so that the tip of the needle is over the bone medial to the artery. Then a mixture of 4 cc of 0.25% Marcaine with 40mg DepoMedrol was injected medial to the artery after negative aspiration. This was done over the Right Occipital nerve. Vitals:    09/23/20 1234   BP: 122/78   Weight: 152 lb (68.9 kg)   Height: 5' 5.5\" (1.664 m)       Conclusion   The patient tolerated the procedure well and with out complication Patient's vital signs remained stable and was discharged home in stable condition. Patient will follow up in office. No orders of the defined types were placed in this encounter.       Electronically signed by Joesph Hartman MD on 9/23/20 at 1:27 PM EDT

## 2020-10-02 ENCOUNTER — HOSPITAL ENCOUNTER (OUTPATIENT)
Age: 65
Discharge: HOME | End: 2020-10-02
Payer: COMMERCIAL

## 2020-10-02 ENCOUNTER — OFFICE VISIT (OUTPATIENT)
Dept: PHYSICAL MEDICINE AND REHAB | Age: 65
End: 2020-10-02
Payer: COMMERCIAL

## 2020-10-02 ENCOUNTER — HOSPITAL ENCOUNTER (OUTPATIENT)
Dept: GENERAL RADIOLOGY | Age: 65
Discharge: HOME | End: 2020-10-02
Payer: COMMERCIAL

## 2020-10-02 VITALS
WEIGHT: 152 LBS | HEIGHT: 66 IN | DIASTOLIC BLOOD PRESSURE: 80 MMHG | TEMPERATURE: 97 F | BODY MASS INDEX: 24.43 KG/M2 | SYSTOLIC BLOOD PRESSURE: 122 MMHG

## 2020-10-02 PROCEDURE — 72040 X-RAY EXAM NECK SPINE 2-3 VW: CPT

## 2020-10-02 PROCEDURE — 99214 OFFICE O/P EST MOD 30 MIN: CPT | Performed by: NURSE PRACTITIONER

## 2020-10-02 RX ORDER — MELOXICAM 7.5 MG/1
7.5 TABLET ORAL DAILY
Qty: 30 TABLET | Refills: 0 | Status: SHIPPED | OUTPATIENT
Start: 2020-10-02 | End: 2020-10-30

## 2020-10-02 ASSESSMENT — ENCOUNTER SYMPTOMS
CONSTIPATION: 1
VOMITING: 0
COLOR CHANGE: 0
NAUSEA: 0
BACK PAIN: 1
ABDOMINAL PAIN: 0
DIARRHEA: 0

## 2020-10-02 NOTE — PROGRESS NOTES
[address] [facility]  [address]  Dept: [phone]  Dept Fax: [phone]: [phone]    Visit Date: 10/2/2020    Functionality Assessment/Goals Worksheet     On a scale of 0 (Does not Interfere) to 10 (Completely Interferes)     1. Which number describes how during the past week pain has interfered with       the following:  A. General Activity:  3 -turning head  B. Mood: 4  C. Walking Ability:  0  D. Normal Work (Includes both work outside the home and housework):  3  E. Relations with Other People:   2  F. Sleep:   1  G. Enjoyment of Life:   4    2. Patient Prefers to Take their Pain Medications:     []  On a regular basis   []  Only when necessary    [x]  Does not take pain medications -no Rx- advil/tylenol daily    3. What are the Patient's Goals/Expectations for Visiting Pain Management? []  Learn about my pain    [x]  Receive Medication   []  Physical Therapy     []  Treat Depression   [x]  Receive Injections    []  Treat Sleep   []  Deal with Anxiety and Stress   []  Treat Opoid Dependence/Addiction   []  Other:      HPI:   Zohra Norton is a 60 y.o. female is here today for    Chief Complaint: Neck pain and Headaches    HPI     Right occipital nerve block with Dr Lindy Mobley 9/23/2020. Patient notes improvement with these overall. Patient with 50% improvement    MRI reviewed from 2018 below. Patient states overall she has seen some improvement but when things wear off the pain is significantly worse than it was just a couple years ago. MRI show significant changes on the left, CT Cervical spine not as detailed on this. Patients pain is all on the right side. Patient very sensitive to medications. Swelling with gabapentin and lyrica. Fatigue and difficulty getting out of bed with nortriptyline.  Patient has complete chiropractic, traction, dry needling, acupuncture, PT, HEP, topical cream, topical NSAIDs. Discussed with patient about trying oral NSAIDs, robaxin TPI, and updated XR. Follow up for how these are doing and consideration for MRI if needed. Medications reviewed. The patientis allergic to levaquin [levofloxacin in d5w] and sulfa antibiotics. Past Medical History  Rianna Sandoval  has a past medical history of Arthritis and PVC (premature ventricular contraction). Past Surgical History  The patient  has a past surgical history that includes Hand surgery (Left, 2018); Hand surgery (Right, ); Thyroid surgery (); Septoplasty (2018);  section (9508,4958,8391,6057,3075); laminectomy (); Hysterectomy (); Abdominal adhesion surgery (); lumbar laminectomy (); cyst removal; Facet joint injection (Right, 2020); Facet joint injection (Right, 3/6/2020); and Pain management procedure (Right, 2020). Family History  This patient's family history is not on file. Social History  Rianna Sandoval  reports that she has quit smoking. She has never used smokeless tobacco. She reports current alcohol use. She reports that she does not use drugs.     Medications    Current Outpatient Medications:     meloxicam (MOBIC) 7.5 MG tablet, Take 1 tablet by mouth daily, Disp: 30 tablet, Rfl: 0    diclofenac sodium (VOLTAREN) 1 % GEL, Apply 2 g topically 4 times daily, Disp: 2 Tube, Rfl: 1    famotidine (PEPCID) 40 MG tablet, daily , Disp: , Rfl:     Multiple Vitamin (MULTI-VITAMINS) TABS, Take by mouth daily , Disp: , Rfl:     NONFORMULARY, Hormone implant every 3 months, Disp: , Rfl:     hydrochlorothiazide (HYDRODIURIL) 25 MG tablet, daily , Disp: , Rfl:     Methylcellulose, Laxative, (CITRUCEL PO), Take by mouth, Disp: , Rfl:     docusate sodium (COLACE) 250 MG capsule, Take 250 mg by mouth 2 times daily, Disp: , Rfl:     SUMAtriptan (IMITREX) 50 MG tablet, Take 50 mg by mouth once as needed for Migraine, Disp: , Rfl:     nortriptyline (PAMELOR) Skin:     General: Skin is warm and dry. Coloration: Skin is not pale. Findings: No rash. Neurological:      Mental Status: She is alert and oriented to person, place, and time. Cranial Nerves: No cranial nerve deficit. Psychiatric:         Attention and Perception: She is attentive. Speech: Speech normal.         Behavior: Behavior normal.         Thought Content: Thought content normal.         Judgment: Judgment normal.            Assessment:     1. Cervical spondylosis    2. Facet arthropathy of spine    3. Cervicalgia    4. Cervical radiculopathy    5. Occipital neuralgia of right side    6. Elective surgical procedure            Plan:      · OARRS reviewed. Current MED: 0  · Patient was not offered naloxone for home. · Discussed long term side effects of medications, tolerance, dependency and addiction. · Previous UDS reviewed  · Patient told can not receive any pain medications from any other source. · No evidence of abuse, diversion or aberrant behavior.  Medications and/or procedures to improve function and quality of life- patient understanding with this and that may not be pain free   Discussed with patient about safe storage of medications at home   Discussed possible weaning of medication dosing dependent on treatment/procedure results.  Testing: XR c-spine   Procedures: ROBAXIN TPI- Upper cervical with Dr Jose Briceno.  Discussed with patient about risks with procedure including infection, reaction to medication, increased pain, or bleeding.  Medications:  Mobic 7.5 daily. Meds. Prescribed:   Orders Placed This Encounter   Medications    meloxicam (MOBIC) 7.5 MG tablet     Sig: Take 1 tablet by mouth daily     Dispense:  30 tablet     Refill:  0       Return for ROBAXIN TPI- Upper cervical with Dr Jose Briceno, follow up with Jacklyn 2 weeks after TPI.            Electronically signed by JARAD Aggarwal CNP on10/2/2020 at 9:33 AM

## 2020-10-14 ENCOUNTER — PROCEDURE VISIT (OUTPATIENT)
Dept: PHYSICAL MEDICINE AND REHAB | Age: 65
End: 2020-10-14
Payer: COMMERCIAL

## 2020-10-14 VITALS
DIASTOLIC BLOOD PRESSURE: 84 MMHG | SYSTOLIC BLOOD PRESSURE: 124 MMHG | BODY MASS INDEX: 24.43 KG/M2 | WEIGHT: 152 LBS | TEMPERATURE: 97.5 F | HEIGHT: 66 IN

## 2020-10-14 PROCEDURE — 20552 NJX 1/MLT TRIGGER POINT 1/2: CPT | Performed by: PAIN MEDICINE

## 2020-10-14 NOTE — PROGRESS NOTES
Procedure  Visit Date: 10/14/20    Lise Mauro is a 60 y.o. female presents today in the office for the following:      History of Present Illness   Zach Larry is here today for trigger point injections. Pre-Op Diagnosis   Myofacial pain syndrome     Post-Op Diagnosis   Myofacial pain syndrome     Procedure Documentation   Patient identified. Consent signed. Site identified. A solution of 9cc 0.25% marcaine and 40mg (1cc) DepoMedrol was combined in each syringe. Site was sprayed with Ethyl chloride and then prepped with alcohol swap X 3. Then with a 25g needle entered each trigger point and after negative aspiration, injected 1-2 cc of Marcaine/DepoMedrol solution at each site. A total of 18 cc was used for procedure. Total of 10 sites were injected into 2 muscles. Vitals:    10/14/20 1006 10/14/20 1030   BP: (!) 140/78 124/84   Site: Left Upper Arm    Position: Sitting    Cuff Size: Medium Adult    Temp: 97.5 °F (36.4 °C)    Weight: 152 lb (68.9 kg)    Height: 5' 5.5\" (1.664 m)        Conclusion   No complications encountered. Patient discharged stable condition. Patient told if any problems to call office or go to ER. No orders of the defined types were placed in this encounter.       Electronically signed by Melony Donahue MD on 10/14/20 at 10:18 AM EDT

## 2020-10-30 ENCOUNTER — OFFICE VISIT (OUTPATIENT)
Dept: PHYSICAL MEDICINE AND REHAB | Age: 65
End: 2020-10-30
Payer: COMMERCIAL

## 2020-10-30 VITALS
HEIGHT: 66 IN | DIASTOLIC BLOOD PRESSURE: 80 MMHG | WEIGHT: 152 LBS | BODY MASS INDEX: 24.43 KG/M2 | SYSTOLIC BLOOD PRESSURE: 122 MMHG

## 2020-10-30 PROCEDURE — 99214 OFFICE O/P EST MOD 30 MIN: CPT | Performed by: NURSE PRACTITIONER

## 2020-10-30 RX ORDER — TOPIRAMATE 25 MG/1
25 TABLET ORAL 2 TIMES DAILY
COMMUNITY

## 2020-10-30 RX ORDER — TIZANIDINE 4 MG/1
4 TABLET ORAL EVERY 6 HOURS PRN
COMMUNITY

## 2020-10-30 RX ORDER — MELOXICAM 15 MG/1
15 TABLET ORAL DAILY
Qty: 30 TABLET | Refills: 2 | Status: SHIPPED | OUTPATIENT
Start: 2020-11-20

## 2020-10-30 ASSESSMENT — ENCOUNTER SYMPTOMS
ABDOMINAL PAIN: 0
DIARRHEA: 0
CONSTIPATION: 1
NAUSEA: 0
VOMITING: 0
COLOR CHANGE: 0
BACK PAIN: 1

## 2020-10-30 NOTE — PROGRESS NOTES
[address] [facility]  [address]  Dept: [phone]  Dept Fax: [phone]: [phone]    Visit Date: 10/30/2020    Functionality Assessment/Goals Worksheet     On a scale of 0 (Does not Interfere) to 10 (Completely Interferes)     1. Which number describes how during the past week pain has interfered with       the following:  A. General Activity:  8  B. Mood: 8  C. Walking Ability:  0  D. Normal Work (Includes both work outside the home and housework):  0  E. Relations with Other People:   6  F. Sleep:   5  G. Enjoyment of Life:   4      2. Patient Prefers to Take their Pain Medications:     [x]  On a regular basis   []  Only when necessary    []  Does not take pain medications    3. What are the Patient's Goals/Expectations for Visiting Pain Management? []  Learn about my pain    [x]  Receive Medication   []  Physical Therapy     []  Treat Depression   [x]  Receive Injections    []  Treat Sleep   []  Deal with Anxiety and Stress   []  Treat Opoid Dependence/Addiction   [x]  Other:  When injections can be done again      HPI:   Dayo Arndt is a 60 y.o. female is here today for    Chief Complaint: Neck pain and Headaches    HPI     Follow up today after upper cervical TPI with DR Mylene Deras on 10/14/2020. Patient states she had an significant increase in pain to which she felt her neck was broke. Patient states that she did see Dr Ciaran Sheldon at [facility] this week, no surgical intervention. Referred to Dr Richmond for pain at [facility] [place]. Patient states they increase Mobic to 15mg daily and added Topamax and Zanaflex. Dr Richmond on occipital nerve block with TPI right paracervical with steroids. Patient only with 2 doses so far of the Topamax. Patient states that with the injections and medication changes there has been about 40% relief thus far.      Cervical RFA still with 50% relief, she continues to be able to look down with little issues    Discussed today about continue topamax at current titration. Along with zanaflex and mobic. Discussed f/u 3 weeks to discuss either Botox or repeat RFA. Patient understanding. Medications reviewed. The patientis allergic to levaquin [levofloxacin in d5w] and sulfa antibiotics. Past Medical History  Cj Burton  has a past medical history of Arthritis and PVC (premature ventricular contraction). Past Surgical History  The patient  has a past surgical history that includes Hand surgery (Left, 2018); Hand surgery (Right, ); Thyroid surgery (); Septoplasty (2018);  section (4421,2290,4544,9259,9324); laminectomy (); Hysterectomy (); Abdominal adhesion surgery (); lumbar laminectomy (); cyst removal; Facet joint injection (Right, 2020); Facet joint injection (Right, 3/6/2020); and Pain management procedure (Right, 2020). Family History  This patient's family history is not on file. Social History  Cj Burton  reports that she has quit smoking. She has never used smokeless tobacco. She reports current alcohol use. She reports that she does not use drugs.     Medications    Current Outpatient Medications:     tiZANidine (ZANAFLEX) 4 MG tablet, Take 4 mg by mouth every 6 hours as needed, Disp: , Rfl:     topiramate (TOPAMAX) 25 MG tablet, Take 25 mg by mouth 2 times daily, Disp: , Rfl:     [START ON 2020] meloxicam (MOBIC) 15 MG tablet, Take 1 tablet by mouth daily, Disp: 30 tablet, Rfl: 2    diclofenac sodium (VOLTAREN) 1 % GEL, Apply 2 g topically 4 times daily, Disp: 2 Tube, Rfl: 1    famotidine (PEPCID) 40 MG tablet, daily , Disp: , Rfl:     Multiple Vitamin (MULTI-VITAMINS) TABS, Take by mouth daily , Disp: , Rfl:     NONFORMULARY, Hormone implant every 3 months, Disp: , Rfl:     hydrochlorothiazide (HYDRODIURIL) 25 MG tablet, daily , Disp: , Rfl:     Methylcellulose, Laxative, (CITRUCEL PO), Take by mouth, Disp: , Rfl:     docusate sodium (COLACE) 250 MG capsule, Take 250 mg by mouth 2 times daily, Disp: , Rfl:     SUMAtriptan (IMITREX) 50 MG tablet, Take 50 mg by mouth once as needed for Migraine, Disp: , Rfl:     nortriptyline (PAMELOR) 10 MG capsule, Take 10 mg by mouth nightly, Disp: , Rfl:     Cyanocobalamin (B-12 PO), Take by mouth daily , Disp: , Rfl:     MAGNESIUM PO, Take 400 mg by mouth daily , Disp: , Rfl:     CycloSPORINE (RESTASIS OP), Apply to eye 2 times daily, Disp: , Rfl:     Cholecalciferol (VITAMIN D PO), Take by mouth daily, Disp: , Rfl:     Subjective:      Review of Systems   Constitutional: Positive for activity change. Negative for chills, diaphoresis, fatigue and fever. Gastrointestinal: Positive for constipation. Negative for abdominal pain, diarrhea, nausea and vomiting. Musculoskeletal: Positive for arthralgias, back pain, myalgias, neck pain and neck stiffness. Negative for gait problem. Skin: Negative for color change, rash and wound. Allergic/Immunologic: Positive for environmental allergies. Negative for food allergies. Neurological: Positive for headaches. Negative for dizziness, seizures, light-headedness and numbness. Hematological: Does not bruise/bleed easily. Psychiatric/Behavioral: Negative for sleep disturbance. The patient is not nervous/anxious. Objective:     Vitals:    10/30/20 0931   BP: 122/80   Weight: 152 lb (68.9 kg)   Height: 5' 5.5\" (1.664 m)       Physical Exam  Vitals signs reviewed. Constitutional:       General: She is not in acute distress. Appearance: She is well-developed. She is not diaphoretic. HENT:      Head: Normocephalic and atraumatic. Not macrocephalic and not microcephalic. Right Ear: External ear normal.      Left Ear: External ear normal.   Eyes:      General:         Right eye: No discharge. Left eye: No discharge.       Conjunctiva/sclera: Conjunctivae normal.   Neck: Musculoskeletal: Pain with movement and muscular tenderness present. Trachea: No tracheal deviation. Pulmonary:      Effort: Pulmonary effort is normal. No respiratory distress. Musculoskeletal:         General: Tenderness present. Skin:     General: Skin is warm and dry. Coloration: Skin is not pale. Findings: No rash. Neurological:      Mental Status: She is alert and oriented to person, place, and time. Cranial Nerves: No cranial nerve deficit. Psychiatric:         Attention and Perception: She is attentive. Speech: Speech normal.         Behavior: Behavior normal.         Thought Content: Thought content normal.         Judgment: Judgment normal.            Assessment:     1. Myofascial pain syndrome    2. Facet arthropathy of spine    3. Cervical spondylosis    4. Cervicalgia    5. Cervical radiculopathy    6. Occipital neuralgia of right side    7. Other migraine without status migrainosus, intractable            Plan:      · OARRS reviewed. Current MED: 0  · Patient was not offered naloxone for home. · Discussed long term side effects of medications, tolerance, dependency and addiction. · Patient told can not receive any pain medications from any other source. · No evidence of abuse, diversion or aberrant behavior.  Medications and/or procedures to improve function and quality of life- patient understanding with this and that may not be pain free   Discussed with patient about safe storage of medications at home   Discussed possible weaning of medication dosing dependent on treatment/procedure results.  Testing: none   Procedures: none   Discussed with patient about risks with procedure including infection, reaction to medication, increased pain, or bleeding.  Medications: continue topamax titration, mobic 15mg daily and zanalfex    Consideration for botox vs repeat C-RFA      Meds.  Prescribed:   Orders Placed This Encounter   Medications    meloxicam (MOBIC) 15 MG tablet     Sig: Take 1 tablet by mouth daily     Dispense:  30 tablet     Refill:  2       Return in about 3 weeks (around 11/20/2020).          Electronically signed by JARAD Longo CNP on10/30/2020 at 9:58 AM

## 2020-10-30 NOTE — PATIENT INSTRUCTIONS
· Medications: continue topamax titration, mobic 15mg daily and zanalfex   · Consideration for botox vs repeat C-RFA
